# Patient Record
Sex: FEMALE | Race: WHITE | Employment: UNEMPLOYED
[De-identification: names, ages, dates, MRNs, and addresses within clinical notes are randomized per-mention and may not be internally consistent; named-entity substitution may affect disease eponyms.]

---
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
## 2024-01-20 ENCOUNTER — HOSPITAL ENCOUNTER (EMERGENCY)
Age: 19
Discharge: HOME | End: 2024-01-20
Attending: EMERGENCY MEDICINE
Payer: COMMERCIAL

## 2024-01-20 VITALS
HEART RATE: 57 BPM | HEIGHT: 65 IN | SYSTOLIC BLOOD PRESSURE: 119 MMHG | WEIGHT: 125 LBS | TEMPERATURE: 97.5 F | BODY MASS INDEX: 20.83 KG/M2 | RESPIRATION RATE: 16 BRPM | OXYGEN SATURATION: 98 % | DIASTOLIC BLOOD PRESSURE: 68 MMHG

## 2024-01-20 DIAGNOSIS — B96.89 BV (BACTERIAL VAGINOSIS): ICD-10-CM

## 2024-01-20 DIAGNOSIS — N39.0 URINARY TRACT INFECTION WITH HEMATURIA, SITE UNSPECIFIED: Primary | ICD-10-CM

## 2024-01-20 DIAGNOSIS — N76.0 BV (BACTERIAL VAGINOSIS): ICD-10-CM

## 2024-01-20 DIAGNOSIS — R31.9 URINARY TRACT INFECTION WITH HEMATURIA, SITE UNSPECIFIED: Primary | ICD-10-CM

## 2024-01-20 LAB
BACTERIA URNS QL MICRO: ABNORMAL
BILIRUB UR QL STRIP: NEGATIVE
C TRACH DNA SPEC QL PROBE+SIG AMP: NORMAL
CANDIDA SPECIES: NEGATIVE
CASTS #/AREA URNS LPF: ABNORMAL /LPF (ref 0–8)
CLARITY UR: ABNORMAL
COLOR UR: YELLOW
EPI CELLS #/AREA URNS HPF: ABNORMAL /HPF (ref 0–5)
GARDNERELLA VAGINALIS: POSITIVE
GLUCOSE UR STRIP-MCNC: NEGATIVE MG/DL
HCG UR QL: NEGATIVE
HGB UR QL STRIP.AUTO: ABNORMAL
KETONES UR STRIP-MCNC: NEGATIVE MG/DL
LEUKOCYTE ESTERASE UR QL STRIP: ABNORMAL
N GONORRHOEA DNA SPEC QL PROBE+SIG AMP: NORMAL
NITRITE UR QL STRIP: POSITIVE
PH UR STRIP: 7.5 [PH] (ref 5–8)
PROT UR STRIP-MCNC: ABNORMAL MG/DL
RBC #/AREA URNS HPF: ABNORMAL /HPF (ref 0–4)
SOURCE: ABNORMAL
SP GR UR STRIP: 1.01 (ref 1–1.03)
SPECIMEN DESCRIPTION: NORMAL
TRICHOMONAS: NEGATIVE
UROBILINOGEN UR STRIP-ACNC: NORMAL EU/DL (ref 0–1)
WBC #/AREA URNS HPF: ABNORMAL /HPF (ref 0–5)

## 2024-01-20 PROCEDURE — 87480 CANDIDA DNA DIR PROBE: CPT

## 2024-01-20 PROCEDURE — 87510 GARDNER VAG DNA DIR PROBE: CPT

## 2024-01-20 PROCEDURE — 87591 N.GONORRHOEAE DNA AMP PROB: CPT

## 2024-01-20 PROCEDURE — 81001 URINALYSIS AUTO W/SCOPE: CPT

## 2024-01-20 PROCEDURE — 87088 URINE BACTERIA CULTURE: CPT

## 2024-01-20 PROCEDURE — 87086 URINE CULTURE/COLONY COUNT: CPT

## 2024-01-20 PROCEDURE — 99283 EMERGENCY DEPT VISIT LOW MDM: CPT | Performed by: EMERGENCY MEDICINE

## 2024-01-20 PROCEDURE — 87660 TRICHOMONAS VAGIN DIR PROBE: CPT

## 2024-01-20 PROCEDURE — 87186 SC STD MICRODIL/AGAR DIL: CPT

## 2024-01-20 PROCEDURE — 87491 CHLMYD TRACH DNA AMP PROBE: CPT

## 2024-01-20 PROCEDURE — 81025 URINE PREGNANCY TEST: CPT

## 2024-01-20 PROCEDURE — 6370000000 HC RX 637 (ALT 250 FOR IP): Performed by: EMERGENCY MEDICINE

## 2024-01-20 RX ORDER — METRONIDAZOLE 500 MG/1
500 TABLET ORAL 2 TIMES DAILY
Qty: 14 TABLET | Refills: 0 | Status: SHIPPED | OUTPATIENT
Start: 2024-01-20 | End: 2024-01-27

## 2024-01-20 RX ORDER — CEPHALEXIN 500 MG/1
500 CAPSULE ORAL 3 TIMES DAILY
Qty: 21 CAPSULE | Refills: 0 | Status: SHIPPED | OUTPATIENT
Start: 2024-01-20 | End: 2024-01-27

## 2024-01-20 RX ORDER — POLYETHYLENE GLYCOL 3350 17 G/17G
17 POWDER, FOR SOLUTION ORAL ONCE
Status: COMPLETED | OUTPATIENT
Start: 2024-01-20 | End: 2024-01-20

## 2024-01-20 RX ORDER — METRONIDAZOLE 500 MG/1
500 TABLET ORAL ONCE
Status: COMPLETED | OUTPATIENT
Start: 2024-01-20 | End: 2024-01-20

## 2024-01-20 RX ORDER — CEPHALEXIN 500 MG/1
500 CAPSULE ORAL ONCE
Status: COMPLETED | OUTPATIENT
Start: 2024-01-20 | End: 2024-01-20

## 2024-01-20 RX ORDER — BISACODYL 5 MG/1
10 TABLET, DELAYED RELEASE ORAL ONCE
Status: COMPLETED | OUTPATIENT
Start: 2024-01-20 | End: 2024-01-20

## 2024-01-20 RX ADMIN — METRONIDAZOLE 500 MG: 500 TABLET ORAL at 16:32

## 2024-01-20 RX ADMIN — POLYETHYLENE GLYCOL 3350 17 G: 17 POWDER, FOR SOLUTION ORAL at 15:36

## 2024-01-20 RX ADMIN — BISACODYL 10 MG: 5 TABLET, COATED ORAL at 15:52

## 2024-01-20 RX ADMIN — CEPHALEXIN 500 MG: 500 CAPSULE ORAL at 16:32

## 2024-01-20 ASSESSMENT — ENCOUNTER SYMPTOMS
ABDOMINAL PAIN: 1
SHORTNESS OF BREATH: 0

## 2024-01-20 NOTE — ED NOTES
Pt to ed with friend. Pt c/o lower abdominal pain, cramping. Pt states ongoing for several weeks. Pt denies vaginal discharge, has had abnormal periods, pt is 3 months post partum from vaginal delivery, stopped taking oral birth control several weeks ago.   Pt has been eating and drinking. States last BM was 2 days ago and she feels a little backed up.

## 2024-01-20 NOTE — ED PROVIDER NOTES
[facility] ED  eMERGENCY dEPARTMENT eNCOUnter   Attending Attestation     Pt Name: Falguni Fleming  MRN: [id number]  Birthdate 2005  Date of evaluation: 1/20/24       Falguni Fleming is a 18 y.o. female who presents with Abdominal Pain      4:13 PM EST      History: Patient presents with abdominal pain.  Patient states that she has been having some vaginal bleeding intermittently.  Patient's been on a new birth control and she thinks that might be the reason why she has had intermittent vaginal bleeding.    Exam: Heart rate and rhythm are regular.  Lungs are clear to station bilaterally.  Abdomen is soft, nontender on my exam.  Patient is awake alert and acting appropriate.    Plan for labs, pregnancy test, patient prefers to self swab.  Patient does not want a pelvic exam at this time.  Will treat based on findings.  Likely discharge.    I performed a history and physical examination of the patient and discussed management with the resident. I reviewed the resident’s note and agree with the documented findings and plan of care. Any areas of disagreement are noted on the chart. I was personally present for the key portions of any procedures. I have documented in the chart those procedures where I was not present during the key portions. I have personally reviewed all images and agree with the resident's interpretation. I have reviewed the emergency nurses triage note. I agree with the chief complaint, past medical history, past surgical history, allergies, medications, social and family history as documented unless otherwise noted below. Documentation of the HPI, Physical Exam and Medical Decision Making performed by medical students or scribes is based on my personal performance of the HPI, PE and MDM. For Phys Assistant/ Nurse Practitioner cases/documentation I have had a face to face evaluation of this patient and have completed at least one if not all key elements of the E/M

## 2024-01-20 NOTE — ED PROVIDER NOTES
[facility] ED  Emergency Department Encounter  Emergency Medicine Resident     Pt Name:Falguni Fleming  MRN: [id number]  Birthdate 2005  Date of evaluation: 1/20/24  PCP:  Kelsea Alonzo APRN - CNP  Note Started: 2:33 PM EST      CHIEF COMPLAINT       Chief Complaint   Patient presents with    Abdominal Pain       HISTORY OF PRESENT ILLNESS  (Location/Symptom, Timing/Onset, Context/Setting, Quality, Duration, Modifying Factors, Severity.)      Falguni Fleming is a 18 y.o. female who presents with left-sided abdominal pain, lower abdominal cramping, urinary frequency.  Patient stated that she did not have a menstrual cycle for a couple months and thought that it was related to taking birth control.  Patient stated that a week ago she had a menstrual cycle however it was heavy with clots and consisted of a lot of abdominal cramping therefore she is unsure if she may have miscarried.  Patient did have a vaginal delivery 5 months ago.  Patient is sexually active.  Denies vaginal discharge or pain. Patient also states that she is constipated and has not had a bowel movement in a couple of days.  Patient is still passing flatus.  Patient denies any chest pain, shortness of breath, nausea, vomiting.    PAST MEDICAL / SURGICAL / SOCIAL / FAMILY HISTORY      has a past medical history of ADHD (attention deficit hyperactivity disorder), Anxiety and depression, Attention deficit hyperactivity disorder (ADHD), and UTI in pregnancy.     has no past surgical history on file.    Social History     Socioeconomic History    Marital status: Single     Spouse name: Not on file    Number of children: Not on file    Years of education: Not on file    Highest education level: Not on file   Occupational History    Not on file   Tobacco Use    Smoking status: Never    Smokeless tobacco: Never   Substance and Sexual Activity    Alcohol use: No    Drug use: No    Sexual activity: Not on file  mouth every 6 hours as needed for Pain 9/5/23   Raquel Mcgowan DO   ferrous sulfate (FE TABS) 325 (65 Fe) MG EC tablet Take 1 tablet by mouth 2 times daily 9/5/23   Raquel Mcgowan DO   ondansetron (ZOFRAN) 4 MG tablet Take 1 tablet by mouth daily as needed for Nausea or Vomiting  Patient not taking: Reported on 1/20/2024 5/17/23   Briseida Francisco MD     REVIEW OF SYSTEMS       Review of Systems   Constitutional:  Negative for fever.   Respiratory:  Negative for shortness of breath.    Cardiovascular:  Negative for chest pain.   Gastrointestinal:  Positive for abdominal pain.   Genitourinary:  Positive for frequency. Negative for vaginal bleeding and vaginal discharge.       PHYSICAL EXAM      INITIAL VITALS:   /68   Pulse 57   Temp 97.5 °F (36.4 °C) (Oral)   Resp 16   Ht 1.651 m (5' 5\")   Wt 56.7 kg (125 lb)   LMP 11/24/2023   SpO2 98%   BMI 20.80 kg/m²     Physical Exam  HENT:      Mouth/Throat:      Mouth: Mucous membranes are moist.   Cardiovascular:      Rate and Rhythm: Normal rate and regular rhythm.      Pulses: Normal pulses.   Pulmonary:      Effort: Pulmonary effort is normal.      Breath sounds: Normal breath sounds.   Abdominal:      General: There is no distension.      Palpations: Abdomen is soft.      Tenderness: There is abdominal tenderness. There is no guarding or rebound.      Comments: No ovarian tenderness to palpation bilaterally   Skin:     General: Skin is warm.      Capillary Refill: Capillary refill takes less than 2 seconds.   Neurological:      Mental Status: She is alert and oriented to person, place, and time.   Psychiatric:         Mood and Affect: Mood normal.     The emergency  DDX/DIAGNOSTIC RESULTS / EMERGENCY DEPARTMENT COURSE / MDM     Medical Decision Making  18-year-old female, presents to the emergency department due to left-sided abdominal pain, lower abdominal cramping, urinary frequency.  Patient stated that she did not have a menstrual cycle for a

## 2024-01-20 NOTE — DISCHARGE INSTRUCTIONS
You were seen here for abdominal pain.    You were found to have bacterial vaginosis and a UTI.  You were started on Flagyl which will treat the BV and Keflex which will treat the UTI.  Take the medication as prescribed.  Refrain from consuming alcohol while taking the antibiotics as it can make you sick.    You need to follow-up gonorrhea and Chlamydia tests in your MyChart and return if they are positive.  You also would need to have your partner be evaluated and treated if your gonorrhea or chlamydia come back positive.    Call and schedule follow-up appointment with your primary care doctor for soon as possible.    Return to the emergency department immediately if you experience worsening symptoms, develop any new symptoms, or if you have any other concerns.

## 2024-01-21 LAB
MICROORGANISM SPEC CULT: ABNORMAL
SPECIMEN DESCRIPTION: ABNORMAL

## 2024-01-22 LAB
C TRACH DNA SPEC QL PROBE+SIG AMP: ABNORMAL
N GONORRHOEA DNA SPEC QL PROBE+SIG AMP: NEGATIVE
SPECIMEN DESCRIPTION: ABNORMAL

## 2024-01-22 NOTE — PROGRESS NOTES
Reviewed patient's urine culture - culture positive for Ecoli.  Patient was discharged on cephalexin, and culture is sensitive to prescribed medication.  Antibiotic prescribed at discharge is appropriate - no changes made to antibiotic regimen.     Aylin Saldana Pharm.D., [facility]

## 2024-01-24 ENCOUNTER — TELEPHONE (OUTPATIENT)
Dept: PHARMACY | Age: 19
End: 2024-01-24

## 2024-01-24 RX ORDER — DOXYCYCLINE HYCLATE 100 MG
100 TABLET ORAL 2 TIMES DAILY
Qty: 14 TABLET | Refills: 0 | Status: SHIPPED | OUTPATIENT
Start: 2024-01-24 | End: 2024-01-31

## 2024-01-24 NOTE — TELEPHONE ENCOUNTER
CLINICAL PHARMACY NOTE:  Telephone Follow-up for Positive STD Test    At the time of Falguni Fleming's visit to [facility] Emergency Department on 1/20/2024 STD testing was performed. DNA testing was positive for Chlamydia.  Pregnancy status:  negative.    Unable to reach patient by phone. Sent letter to patient on 1/24/2024 regarding need to start antibiotic therapy and receive an injection to treat the infection. Advised patient to go to the [facility], their local urgent care/ED, or return to the [facility] for treatment.     Per protocol, prescription for doxycycline 100mg orally twice daily x 7 days sent to [facility] outpatient pharmacy on behalf of Dr. Jose Rafael Mclaughlin.      For Pharmacy Admin Tracking Only    Intervention Detail: New Rx: 1, reason: Needs Additional Therapy  Total # of Interventions Recommended: 1  Total # of Interventions Accepted: 1  Time Spent (min): 10

## 2024-03-04 ENCOUNTER — HOSPITAL ENCOUNTER (EMERGENCY)
Age: 19
Discharge: HOME | End: 2024-03-04
Attending: EMERGENCY MEDICINE
Payer: MEDICAID

## 2024-03-04 VITALS
SYSTOLIC BLOOD PRESSURE: 117 MMHG | TEMPERATURE: 97.4 F | HEART RATE: 66 BPM | OXYGEN SATURATION: 98 % | DIASTOLIC BLOOD PRESSURE: 58 MMHG | RESPIRATION RATE: 18 BRPM

## 2024-03-04 DIAGNOSIS — Z32.02 PREGNANCY TEST NEGATIVE: Primary | ICD-10-CM

## 2024-03-04 LAB — HCG SERPL QL: NEGATIVE

## 2024-03-04 PROCEDURE — 99283 EMERGENCY DEPT VISIT LOW MDM: CPT

## 2024-03-04 PROCEDURE — 84703 CHORIONIC GONADOTROPIN ASSAY: CPT

## 2024-03-04 ASSESSMENT — ENCOUNTER SYMPTOMS
ABDOMINAL PAIN: 0
CONSTIPATION: 0
ABDOMINAL DISTENTION: 0
DIARRHEA: 0
SHORTNESS OF BREATH: 0
CHEST TIGHTNESS: 0
TROUBLE SWALLOWING: 0
VOMITING: 0
SORE THROAT: 0
BACK PAIN: 0
NAUSEA: 1
COUGH: 0

## 2024-03-04 NOTE — ED PROVIDER NOTES
[facility] ED  Emergency Department Encounter  Emergency Medicine Resident     Pt Name:Falguni Fleming  MRN: [id number]  Birthdate 2005  Date of evaluation: 3/4/24  PCP:  Kelsea Alonzo APRN - CNP  Note Started: 3:38 PM EST      CHIEF COMPLAINT       Chief Complaint   Patient presents with    Pregnancy Test     Requesting blood test        HISTORY OF PRESENT ILLNESS  (Location/Symptom, Timing/Onset, Context/Setting, Quality, Duration, Modifying Factors, Severity.)      Falguni Fleming is a 18 y.o. female who presents to the emergency department due to concern for pregnancy.  Patient states her last menstrual period was on January 13, she states that her period is normally very regular.  She is now 3 weeks late and states that this is never happened to her before.  She does not really have any abdominal pain she did have some nausea this morning.  Patient does report that she was seen about a month ago due to concerns for STDs, she denies any concern for STDs or UTI at this time.  Offered patient pelvic exam, she would like to defer at this time.  States she just wants a blood pregnancy test, did take 7 home pregnancy test that were negative.    PAST MEDICAL / SURGICAL / SOCIAL / FAMILY HISTORY      has a past medical history of ADHD (attention deficit hyperactivity disorder), Anxiety and depression, Attention deficit hyperactivity disorder (ADHD), and UTI in pregnancy.       has no past surgical history on file.      Social History     Socioeconomic History    Marital status: Single     Spouse name: Not on file    Number of children: Not on file    Years of education: Not on file    Highest education level: Not on file   Occupational History    Not on file   Tobacco Use    Smoking status: Never    Smokeless tobacco: Never   Substance and Sexual Activity    Alcohol use: No    Drug use: No    Sexual activity: Not on file   Other Topics Concern    Not on file   Social

## 2024-03-04 NOTE — ED PROVIDER NOTES
[facility] ED     Emergency Department     Faculty Attestation    I performed a history and physical examination of the patient and discussed management with the resident. I reviewed the resident’s note and agree with the documented findings and plan of care. Any areas of disagreement are noted on the chart. I was personally present for the key portions of any procedures. I have documented in the chart those procedures where I was not present during the key portions. I have reviewed the emergency nurses triage note. I agree with the chief complaint, past medical history, past surgical history, allergies, medications, social and family history as documented unless otherwise noted below. For Physician Assistant/ Nurse Practitioner cases/documentation I have personally evaluated this patient and have completed at least one if not all key elements of the E/M (history, physical exam, and MDM). Additional findings are as noted.    1:14 PM EST    Patient presents requesting a pregnancy test.  Patient says she has not had her menstrual period since January 14 and is not usual for her to skip periods.  Patient says she did have a baby in September 2023 but is not currently breast-feeding nor is on any kind of contraception.  She says she did take a urine pregnancy test at home which have been negative.  She says she has felt some occasional nausea but has no other complaints.  Will check a pregnancy test.      Christine Vela MD  Attending Emergency  Physician

## 2024-03-04 NOTE — ED TRIAGE NOTES
Pt to ed requesting blood pregnancy test. Per pt her period is 3 weeks late and she has taken about 6 pregnancy tests at home that were all negative. Pt states she has had intermittent nausea/vomiting over the past couple weeks. Pt denies vaginal bleeding or discharge.     Pt is alert and oriented x4. Ambulatory to room. Vitals stable. Iv placed, labs obtained. Call light in reach.

## 2024-03-04 NOTE — DISCHARGE INSTRUCTIONS
You were seen in the emergency department today for concern for pregnancy your work-up showed you are not pregnant    Please follow-up with your PCP within the next 2 to 3 days, we provided you with the OB/GYN clinic as well      PLEASE RETURN TO THE EMERGENCY DEPARTMENT IMMEDIATELY for worsening symptoms, or if you develop any concerning symptoms such as: high fever not relieved by acetaminophen (Tylenol) and/or ibuprofen (Motrin), chills, shortness of breath, chest pain, persistent nausea and/or vomiting, numbness, weakness or tingling in the arms or legs or change in color of the extremities, changes in mental status, persistent headache, blurry vision, unable to follow up with your physician, or other any other care or concern.